# Patient Record
Sex: FEMALE | Race: WHITE | NOT HISPANIC OR LATINO | Employment: UNEMPLOYED | URBAN - METROPOLITAN AREA
[De-identification: names, ages, dates, MRNs, and addresses within clinical notes are randomized per-mention and may not be internally consistent; named-entity substitution may affect disease eponyms.]

---

## 2019-05-02 ENCOUNTER — OFFICE VISIT (OUTPATIENT)
Dept: OBSTETRICS AND GYNECOLOGY | Facility: CLINIC | Age: 64
End: 2019-05-02
Attending: OBSTETRICS & GYNECOLOGY
Payer: COMMERCIAL

## 2019-05-02 VITALS
SYSTOLIC BLOOD PRESSURE: 140 MMHG | BODY MASS INDEX: 27.98 KG/M2 | HEIGHT: 63 IN | DIASTOLIC BLOOD PRESSURE: 88 MMHG | WEIGHT: 157.94 LBS

## 2019-05-02 DIAGNOSIS — N63.10 BREAST MASS, RIGHT: ICD-10-CM

## 2019-05-02 DIAGNOSIS — Z11.51 SCREENING FOR HUMAN PAPILLOMAVIRUS (HPV): ICD-10-CM

## 2019-05-02 DIAGNOSIS — Z01.419 ENCOUNTER FOR GYNECOLOGICAL EXAMINATION: Primary | ICD-10-CM

## 2019-05-02 DIAGNOSIS — N64.4 BREAST PAIN: ICD-10-CM

## 2019-05-02 PROCEDURE — 99387 PR PREVENTIVE VISIT,NEW,65 & OVER: ICD-10-PCS | Mod: S$GLB,,, | Performed by: OBSTETRICS & GYNECOLOGY

## 2019-05-02 PROCEDURE — 99999 PR PBB SHADOW E&M-EST. PATIENT-LVL III: CPT | Mod: PBBFAC,,, | Performed by: OBSTETRICS & GYNECOLOGY

## 2019-05-02 PROCEDURE — 88175 CYTOPATH C/V AUTO FLUID REDO: CPT

## 2019-05-02 PROCEDURE — 99999 PR PBB SHADOW E&M-EST. PATIENT-LVL III: ICD-10-PCS | Mod: PBBFAC,,, | Performed by: OBSTETRICS & GYNECOLOGY

## 2019-05-02 PROCEDURE — 99387 INIT PM E/M NEW PAT 65+ YRS: CPT | Mod: S$GLB,,, | Performed by: OBSTETRICS & GYNECOLOGY

## 2019-05-02 RX ORDER — MINOCYCLINE HYDROCHLORIDE 100 MG/1
CAPSULE ORAL
Refills: 1 | COMMUNITY
Start: 2019-02-25 | End: 2019-05-02

## 2019-05-02 RX ORDER — CETIRIZINE HYDROCHLORIDE 10 MG/1
10 TABLET ORAL NIGHTLY
Refills: 3 | COMMUNITY
Start: 2019-02-14 | End: 2023-12-23

## 2019-05-02 RX ORDER — PROMETHAZINE HYDROCHLORIDE AND DEXTROMETHORPHAN HYDROBROMIDE 6.25; 15 MG/5ML; MG/5ML
SYRUP ORAL
Refills: 0 | COMMUNITY
Start: 2019-03-14 | End: 2019-05-02

## 2019-05-02 RX ORDER — HYDROCODONE BITARTRATE AND ACETAMINOPHEN 10; 325 MG/1; MG/1
1 TABLET ORAL 3 TIMES DAILY PRN
Refills: 0 | COMMUNITY
Start: 2019-04-15 | End: 2023-12-23

## 2019-05-02 RX ORDER — BENZPHETAMINE HYDROCHLORIDE 50 MG/1
TABLET ORAL
Refills: 0 | COMMUNITY
Start: 2019-04-24 | End: 2023-12-23

## 2019-05-02 RX ORDER — AZITHROMYCIN 250 MG/1
TABLET, FILM COATED ORAL
Refills: 0 | COMMUNITY
Start: 2019-01-28 | End: 2019-05-02

## 2019-05-02 RX ORDER — MUPIROCIN 20 MG/G
OINTMENT TOPICAL
Refills: 0 | COMMUNITY
Start: 2019-03-14 | End: 2019-05-02

## 2019-05-02 NOTE — PROGRESS NOTES
CC: Well woman exam    Candi Chauhan is a 63 y.o. female  presents for a well woman exam.  She is not established. LMP: No LMP recorded. Patient is postmenopausal..      C/o throbbing pain under right arm, h/o augmentation complicated by encapsulation for the past 5 years, hasn't had a mmg since 2016 and feels extra hard spot at 2 oclock    On HRT compounded  100mg progesterone, 4mg testosterone, wants refill      Past Medical History:   Diagnosis Date    Anxiety     Back pain     Depression     Foot pain     Knee pain      Past Surgical History:   Procedure Laterality Date    ANKLE SURGERY  2014     SECTION      EXCISION-WIDE LOCAL OF THE VULVAR LESION N/A 10/11/2016    Performed by [facility]aves [facility] at [facility] OR    KNEE SURGERY       Social History     Socioeconomic History    Marital status:      Spouse name: Not on file    Number of children: Not on file    Years of education: Not on file    Highest education level: Not on file   Occupational History    Not on file   Social Needs    Financial resource strain: Not on file    Food insecurity:     Worry: Not on file     Inability: Not on file    Transportation needs:     Medical: Not on file     Non-medical: Not on file   Tobacco Use    Smoking status: Never Smoker    Smokeless tobacco: Never Used   Substance and Sexual Activity    Alcohol use: Yes     Comment: occ    Drug use: No    Sexual activity: Yes     Partners: Male     Birth control/protection: None   Lifestyle    Physical activity:     Days per week: Not on file     Minutes per session: Not on file    Stress: Not on file   Relationships    Social connections:     Talks on phone: Not on file     Gets together: Not on file     Attends Yarsani service: Not on file     Active member of club or organization: Not on file     Attends meetings of clubs or organizations: Not on file     Relationship status: Not on file   Other Topics Concern    Not on file  "  Social History Narrative    Not on file     Family History   Problem Relation Age of Onset    Stroke Mother     Heart failure Father     Diabetes Father     Hypertension Brother     Coronary artery disease Sister     Colon cancer Paternal Cousin     Obesity Sister     Breast cancer Neg Hx     Ovarian cancer Neg Hx      OB History        4    Para        Term                AB        Living   4       SAB        TAB        Ectopic        Multiple        Live Births                     BP (!) 140/88   Ht 5' 3" (1.6 m)   Wt 71.6 kg (157 lb 15.4 oz)   BMI 27.98 kg/m²       ROS:    ROS:  GENERAL: Denies weight gain or weight loss. Feeling well overall.   SKIN: Denies rash or lesions.   HEAD: Denies head injury or headache.   NODES: Denies enlarged lymph nodes.   CHEST: Denies chest pain or shortness of breath.   CARDIOVASCULAR: Denies palpitations or left sided chest pain.   ABDOMEN: No abdominal pain, constipation, diarrhea, nausea, vomiting or rectal bleeding.   URINARY: No frequency, dysuria, hematuria, or burning on urination.  REPRODUCTIVE: See HPI.   BREASTS: The patient performs breast self-examination and denies pain, lumps, or nipple discharge.   HEMATOLOGIC: No easy bruisability or excessive bleeding.   MUSCULOSKELETAL: Denies joint pain or swelling.   NEUROLOGIC: Denies syncope or weakness.   PSYCHIATRIC: Denies depression, anxiety or mood swings.    PHYSICAL EXAM:    APPEARANCE: Well nourished, well developed, in no acute distress.  AFFECT: WNL, alert and oriented x 3  SKIN: No acne or hirsutism  NECK: Neck symmetric without masses or thyromegaly  NODES: No inguinal, cervical, axillary, or femoral lymph node enlargement  CHEST: Good respiratory effect  ABDOMEN: Soft.  No tenderness or masses.  No hepatosplenomegaly.  No hernias.  BREASTS: Symmetrical, no skin changes or visible lesions.  RIGHT BREAST HARD AND CONSISTENT WITH ENCAPSULATION WITH ENHANCED AREA AT 2OCLOCK, nipple " discharge bilaterally. No masses in left breast  PELVIC: Normal external genitalia without lesions.  Normal hair distribution.  Adequate perineal body, normal urethral meatus.  Vagina moist and well rugated without lesions or discharge.  Cervix pink, without lesions, discharge or tenderness.  No significant cystocele or rectocele.  Bimanual exam shows uterus to be normal size, regular, mobile and nontender.  Adnexa without masses or tenderness.    RECTAL: Rectovaginal exam confirms above with normal sphincter tone, no masses.  EXTREMITIES: No edema.      ICD-10-CM ICD-9-CM    1. Encounter for gynecological examination Z01.419 V72.31 Liquid-based pap smear, screening   2. Screening for human papillomavirus (HPV) Z11.51 V73.81 Liquid-based pap smear, screening   3. Breast pain N64.4 611.71 Mammo Digital Diagnostic Bilat w/ Rich      Ambulatory Referral to Breast Surgery      US Breast Bilateral Complete   4. Breast mass, right N63.10 611.72 Ambulatory Referral to Breast Surgery      US Breast Bilateral Complete         Patient was counseled today on A.C.S. Pap guidelines and recommendations for yearly pelvic exams, mammograms and monthly self breast exams; to see her PCP for other health maintenance.     Get mmg if can but otherwise ultrasound.  To breast surgeon    Follow up in about 1 year (around 5/2/2020).

## 2019-05-08 ENCOUNTER — HOSPITAL ENCOUNTER (OUTPATIENT)
Dept: RADIOLOGY | Facility: HOSPITAL | Age: 64
Discharge: HOME | End: 2019-05-08
Attending: OBSTETRICS & GYNECOLOGY
Payer: COMMERCIAL

## 2019-05-08 DIAGNOSIS — N64.4 BREAST PAIN: ICD-10-CM

## 2019-05-08 DIAGNOSIS — N63.10 BREAST MASS, RIGHT: ICD-10-CM

## 2019-05-08 PROCEDURE — 76642 ULTRASOUND BREAST LIMITED: CPT | Mod: TC,RT

## 2019-05-08 PROCEDURE — 77066 MAMMO DIGITAL DIAGNOSTIC BILAT WITH TOMOSYNTHESIS_CAD: ICD-10-PCS | Mod: 26,,, | Performed by: RADIOLOGY

## 2019-05-08 PROCEDURE — 77062 MAMMO DIGITAL DIAGNOSTIC BILAT WITH TOMOSYNTHESIS_CAD: ICD-10-PCS | Mod: 26,,, | Performed by: RADIOLOGY

## 2019-05-08 PROCEDURE — 77066 DX MAMMO INCL CAD BI: CPT | Mod: TC

## 2019-05-08 PROCEDURE — 76642 ULTRASOUND BREAST LIMITED: CPT | Mod: 26,RT,, | Performed by: RADIOLOGY

## 2019-05-08 PROCEDURE — 77066 DX MAMMO INCL CAD BI: CPT | Mod: 26,,, | Performed by: RADIOLOGY

## 2019-05-08 PROCEDURE — 77062 BREAST TOMOSYNTHESIS BI: CPT | Mod: 26,,, | Performed by: RADIOLOGY

## 2019-05-08 PROCEDURE — 76642 US BREAST RIGHT LIMITED: ICD-10-PCS | Mod: 26,RT,, | Performed by: RADIOLOGY

## 2019-05-10 ENCOUNTER — TELEPHONE (OUTPATIENT)
Dept: OBSTETRICS AND GYNECOLOGY | Facility: CLINIC | Age: 64
End: 2019-05-10

## 2019-05-10 NOTE — TELEPHONE ENCOUNTER
----- Message from Dax De Souza MD sent at 5/9/2019 11:30 AM CDT -----  Call patient mmg normal, I still want her to see breast surgeon.  
Pt. notifed with normal mammogram and will see a breast surgeon as well.     Message related to pt. As follow:      
General

## 2019-05-22 ENCOUNTER — TELEPHONE (OUTPATIENT)
Dept: SURGERY | Facility: CLINIC | Age: 64
End: 2019-05-22

## 2019-05-22 NOTE — TELEPHONE ENCOUNTER
Returned patient's call and have her rescheduled to see Mariangel STEIN on 5/29/19 at 3pm. Patient voiced understanding of appointment date, time, and location. Appointment letter mailed to patient.    ----- Message from Sepideh Nava sent at 5/22/2019  8:59 AM CDT -----  Reason for call:Pt calling to reschedule her appt, next available on my end is 7/11/19.        Communication Preference:[phone]    Additional Information:

## 2019-05-29 ENCOUNTER — OFFICE VISIT (OUTPATIENT)
Dept: PLASTIC SURGERY | Facility: CLINIC | Age: 64
End: 2019-05-29
Payer: COMMERCIAL

## 2019-05-29 VITALS
HEIGHT: 63 IN | SYSTOLIC BLOOD PRESSURE: 177 MMHG | TEMPERATURE: 98 F | BODY MASS INDEX: 28.56 KG/M2 | HEART RATE: 73 BPM | DIASTOLIC BLOOD PRESSURE: 73 MMHG | WEIGHT: 161.19 LBS

## 2019-05-29 DIAGNOSIS — T85.44XA CAPSULAR CONTRACTURE OF BREAST IMPLANT, INITIAL ENCOUNTER: Primary | ICD-10-CM

## 2019-05-29 PROCEDURE — 3008F PR BODY MASS INDEX (BMI) DOCUMENTED: ICD-10-PCS | Mod: CPTII,S$GLB,, | Performed by: SURGERY

## 2019-05-29 PROCEDURE — 1101F PT FALLS ASSESS-DOCD LE1/YR: CPT | Mod: CPTII,S$GLB,, | Performed by: SURGERY

## 2019-05-29 PROCEDURE — 99999 PR PBB SHADOW E&M-EST. PATIENT-LVL III: CPT | Mod: PBBFAC,,, | Performed by: SURGERY

## 2019-05-29 PROCEDURE — 99203 OFFICE O/P NEW LOW 30 MIN: CPT | Mod: S$GLB,,, | Performed by: SURGERY

## 2019-05-29 PROCEDURE — 99999 PR PBB SHADOW E&M-EST. PATIENT-LVL III: ICD-10-PCS | Mod: PBBFAC,,, | Performed by: SURGERY

## 2019-05-29 PROCEDURE — 99203 PR OFFICE/OUTPT VISIT, NEW, LEVL III, 30-44 MIN: ICD-10-PCS | Mod: S$GLB,,, | Performed by: SURGERY

## 2019-05-29 PROCEDURE — 3008F BODY MASS INDEX DOCD: CPT | Mod: CPTII,S$GLB,, | Performed by: SURGERY

## 2019-05-29 PROCEDURE — 1101F PR PT FALLS ASSESS DOC 0-1 FALLS W/OUT INJ PAST YR: ICD-10-PCS | Mod: CPTII,S$GLB,, | Performed by: SURGERY

## 2019-05-29 NOTE — PROGRESS NOTES
Candi Chauhan is a 63 y.o. female who's overall healthy with history of bilateral breat implants placed in  presents for evaluation of right breast pain that began about 6 months ago. She denies trauma to the breast, but reports that her right breast has progressively become more painful and firm over the last six months. She reports that her right implant is placed above the pectoral muscle and the left implant is under the pectoral muscle because she developed a hematoma post operatively in the left breast. She had recent mammogram and US that showed no malignancy. She wishes to have the implants removed and replaced and a lift done. She's overall healthy, drinks minimally and does not smoke. She's size C cup size.     Past Medical History:   Diagnosis Date    Anxiety     Back pain     Depression     Foot pain     Knee pain        Past Surgical History:   Procedure Laterality Date    ANKLE SURGERY      AUGMENTATION OF BREAST       SECTION      EXCISION-WIDE LOCAL OF THE VULVAR LESION N/A 10/11/2016    Performed by [facility]aves [facility] at [facility] OR    KNEE SURGERY         Current Outpatient Medications on File Prior to Visit   Medication Sig Dispense Refill    alprazolam (XANAX) 1 MG tablet TAKE ONE TABLET BY MOUTH THREE TIMES DAILY MAY CAUSE DROWSINESS  0    benzphetamine 50 mg Tab TAKE ONE TABLET BY MOUTH upto THREE TIMES DAILY  0    cetirizine (ZYRTEC) 10 MG tablet Take 10 mg by mouth nightly.  3    fluticasone propionate (FLONASE) 50 mcg/actuation nasal spray SPRAY TWICE INTO EACH NOSTRIL ONCE A DAY AS NEEDED  0    HYDROcodone-acetaminophen (NORCO)  mg per tablet Take 1 tablet by mouth 3 (three) times daily as needed.  0    ibuprofen (ADVIL,MOTRIN) 800 MG tablet Take 1 tablet (800 mg total) by mouth every 8 (eight) hours as needed for Pain. 30 tablet 1    PROAIR HFA 90 mcg/actuation inhaler Take 2 puff(s) by mouth every 6 hours as needed  0     No current  facility-administered medications on file prior to visit.        Observation  Constitutional: negative for fevers  Eyes: negative visual changes  ENT: negative for hearing loss  Respiratory: negative for dyspnea  Cardiovascular: negative for chest pain  Gastrointestinal: negative for abdominal pain    Vitals: Afebrile.  Vital signs stable.  General: No acute distress.  Cardio: Regular rate.  Chest: No increased work of breathing.    Breast:  Right breast is firm and quite tender to touch. No masses palpated.   Left breast has 4 mm mole.    A/P  Patient likely has ruptured implant.   Patient was offered removal, replacement, or just a mastopexy, patient elects to proceed with replacement and mastopexy.   Will obtain photos  Risks and benefits discussed including loss of nipple. Her procedure will be done in two stages to provide best possible blood supply to the nipple.   She currently wears a size C.

## 2019-05-29 NOTE — LETTER
[facility]  [address]  [facility] [address]  Phone: [phone]  Fax: [phone] May 29, 2019      Agustina Mejia MD  [address]  [facility] [place] [address]    Patient: Candi Chauhan   MR Number: [id number]   YOB: 1955   Date of Visit: 5/29/2019     Dear Dr. Agustina Mejia:    Thank you for referring Candi Chauhan to me for evaluation. Below you will find relevant portions of my assessment and plan of care.    Ms. Chauhan' surgery will be relatively complex.  She has a severe capsular contracture of the right breast, which is in the prepectoral position and mild capsular contracture of the left breast, which is subpectoral.  She has a fair amount of breast tissue on the left side, but not much breast tissue on the right.  Therefore, she is going to need an implant exchange plus a lift, but I feel that this should be done in a two staged procedure.  This can be very difficult to peel the calcified right prepectoral implant from behind the nipple areolar complex.  So, I think that we will go ahead and do an implant removal, total capsulectomy, implant replacement and then come back in a second procedure and do a bilateral mastopexy.  We will have Laura quote fees.     If you have questions, please do not hesitate to call me. I look forward to following Candi Chauhan along with you.    Sincerely,    Joaquin Kidd MD  Section of Plastic Surgery  Department of Surgery  [facility]     CRB/hcr    CC:  Jeremias Nguyễn MD

## 2019-05-29 NOTE — PROGRESS NOTES
I personally have examined the patient and agree with the resident's findings and plan of action.  Ms. Chauhan' surgery will be relatively complex.  She has a severe capsular   contracture of the right breast, which is in the prepectoral position and mild   capsular contracture of the left breast, which is subpectoral.  She has a fair   amount of breast tissue on the left side, but not much breast tissue on the   right.  Therefore, she is going to need an implant exchange plus a lift, but I   feel that this should be done in a two staged procedure.  This can be very   difficult to peel the calcified right prepectoral implant from behind the nipple   areolar complex.  So, I think that we will go ahead and do an implant removal,   total capsulectomy, implant replacement and then come back in a second procedure   and do a bilateral mastopexy.  We will have Laura bessote fees on Monday.        MANUELA/VERNA  dd: 05/29/2019 16:15:27 (CDT)  td: 05/30/2019 01:56:47 (CDT)  Doc ID   #[id number]  Job ID #[id number]    CC:

## 2019-06-04 ENCOUNTER — TELEPHONE (OUTPATIENT)
Dept: PLASTIC SURGERY | Facility: CLINIC | Age: 64
End: 2019-06-04

## 2019-06-04 NOTE — TELEPHONE ENCOUNTER
Called patient to book surgery; pt states she needs quotes for surgery procedure and still needs to know the details of surgery.

## 2019-11-19 ENCOUNTER — TELEPHONE (OUTPATIENT)
Dept: OBSTETRICS AND GYNECOLOGY | Facility: CLINIC | Age: 64
End: 2019-11-19

## 2019-11-19 NOTE — TELEPHONE ENCOUNTER
[facility] [address] [place] [address] faxed over a request for pt of BI-E 80:20 2mg/ Progest 100mg/Testosterone 4mg capsule [facility] # [phone] fax # [phone]

## 2019-11-19 NOTE — TELEPHONE ENCOUNTER
[facility] called asking for Dr. De Souza to confirm a compound medication for pt. Please advise. Thank you.

## 2020-04-06 ENCOUNTER — TELEPHONE (OUTPATIENT)
Dept: OBSTETRICS AND GYNECOLOGY | Facility: CLINIC | Age: 65
End: 2020-04-06

## 2020-04-06 NOTE — TELEPHONE ENCOUNTER
Pt. Requesting refill:        BI-E 80:20 2mg/ Progest 100mg/Testosterone 4mg capsule [facility] # [phone] fax # [phone]

## 2020-04-07 NOTE — TELEPHONE ENCOUNTER
Pharmacy number provided:  Will you tell me exactly how to order it for her, please.     Progesterone/Testosterone

## 2020-04-07 NOTE — TELEPHONE ENCOUNTER
Pt. Requesting refill:   we have to calling her medication:      BI-E 80:20 2mg/ Progest 100mg/Testosterone 4mg capsule,  [facility]  # [phone] fax # [phone]

## 2020-04-07 NOTE — TELEPHONE ENCOUNTER
Call the pharmacist and give verbal to authorize a refill.  It's a compounding phamarcy and it's their dosing and instructions.

## 2020-04-09 ENCOUNTER — TELEPHONE (OUTPATIENT)
Dept: OBSTETRICS AND GYNECOLOGY | Facility: CLINIC | Age: 65
End: 2020-04-09

## 2020-04-13 ENCOUNTER — TELEPHONE (OUTPATIENT)
Dept: OBSTETRICS AND GYNECOLOGY | Facility: CLINIC | Age: 65
End: 2020-04-13

## 2020-04-13 NOTE — TELEPHONE ENCOUNTER
RX:  Called in:  BI-E 80:20 2mg/ Progest 100mg/Testosterone 4mg capsule,  [facility]  # [phone] fax # [phone]

## 2020-06-08 ENCOUNTER — TELEPHONE (OUTPATIENT)
Dept: OBSTETRICS AND GYNECOLOGY | Facility: CLINIC | Age: 65
End: 2020-06-08

## 2020-06-08 NOTE — TELEPHONE ENCOUNTER
Pt. Requesting refill of a compound med.  Progesterone/testosterone  Will you approve this medication,  Last refill 11/2019

## 2020-06-09 NOTE — TELEPHONE ENCOUNTER
Spoke with pt. Who stated she has enough meds not and will have pharmacy request a refill of her compound meds. Till later, and wants to call back to set up appt. For her annual

## 2020-07-21 ENCOUNTER — TELEPHONE (OUTPATIENT)
Dept: OBSTETRICS AND GYNECOLOGY | Facility: CLINIC | Age: 65
End: 2020-07-21

## 2020-07-21 NOTE — TELEPHONE ENCOUNTER
Pt. Will contact her pharmacy to discuss refill request, pt. Has had enough refills to do her till sept. She will contact me back.

## 2020-07-23 ENCOUNTER — TELEPHONE (OUTPATIENT)
Dept: OBSTETRICS AND GYNECOLOGY | Facility: CLINIC | Age: 65
End: 2020-07-23

## 2020-07-23 NOTE — TELEPHONE ENCOUNTER
Called in compound medication for pt. With dr. Borges's approval, dr. De Souza is out today.    Bi-E 8020 2 mg / [id number]/Testosterone4 mg Capsule with one additional refill, pt. Has annual appt. Set with dr. wax

## 2020-07-23 NOTE — TELEPHONE ENCOUNTER
Bi-E 8020 2mg/[id number]/[id number] Capsule     Add as: unknown     Will you approve this medication to be called in for pt.     Dr. De Souza out today.

## 2020-10-01 ENCOUNTER — OFFICE VISIT (OUTPATIENT)
Dept: OBSTETRICS AND GYNECOLOGY | Facility: CLINIC | Age: 65
End: 2020-10-01
Attending: OBSTETRICS & GYNECOLOGY
Payer: MEDICARE

## 2020-10-01 ENCOUNTER — LAB VISIT (OUTPATIENT)
Dept: LAB | Facility: OTHER | Age: 65
End: 2020-10-01
Attending: OBSTETRICS & GYNECOLOGY
Payer: MEDICARE

## 2020-10-01 VITALS
WEIGHT: 178.44 LBS | BODY MASS INDEX: 31.62 KG/M2 | HEIGHT: 63 IN | DIASTOLIC BLOOD PRESSURE: 70 MMHG | SYSTOLIC BLOOD PRESSURE: 130 MMHG

## 2020-10-01 DIAGNOSIS — Z11.3 SCREEN FOR STD (SEXUALLY TRANSMITTED DISEASE): ICD-10-CM

## 2020-10-01 DIAGNOSIS — R10.2 PELVIC PAIN: ICD-10-CM

## 2020-10-01 DIAGNOSIS — Z11.51 ENCOUNTER FOR SCREENING FOR HUMAN PAPILLOMAVIRUS (HPV): ICD-10-CM

## 2020-10-01 DIAGNOSIS — Z01.419 ENCOUNTER FOR GYNECOLOGICAL EXAMINATION: Primary | ICD-10-CM

## 2020-10-01 DIAGNOSIS — R10.819 ABDOMINAL TENDERNESS, REBOUND TENDERNESS PRESENCE NOT SPECIFIED, UNSPECIFIED LOCATION: ICD-10-CM

## 2020-10-01 DIAGNOSIS — Z11.3 SCREENING EXAMINATION FOR STD (SEXUALLY TRANSMITTED DISEASE): ICD-10-CM

## 2020-10-01 DIAGNOSIS — Z12.4 PAP SMEAR FOR CERVICAL CANCER SCREENING: ICD-10-CM

## 2020-10-01 DIAGNOSIS — Z12.31 OTHER SCREENING MAMMOGRAM: ICD-10-CM

## 2020-10-01 LAB
CANDIDA RRNA VAG QL PROBE: NEGATIVE
G VAGINALIS RRNA GENITAL QL PROBE: NEGATIVE
T VAGINALIS RRNA GENITAL QL PROBE: NEGATIVE

## 2020-10-01 PROCEDURE — G0101 CA SCREEN;PELVIC/BREAST EXAM: HCPCS | Mod: S$GLB,,, | Performed by: OBSTETRICS & GYNECOLOGY

## 2020-10-01 PROCEDURE — 87510 GARDNER VAG DNA DIR PROBE: CPT

## 2020-10-01 PROCEDURE — 86703 HIV-1/HIV-2 1 RESULT ANTBDY: CPT

## 2020-10-01 PROCEDURE — 99999 PR PBB SHADOW E&M-EST. PATIENT-LVL IV: CPT | Mod: PBBFAC,,, | Performed by: OBSTETRICS & GYNECOLOGY

## 2020-10-01 PROCEDURE — 36415 COLL VENOUS BLD VENIPUNCTURE: CPT

## 2020-10-01 PROCEDURE — 88175 CYTOPATH C/V AUTO FLUID REDO: CPT

## 2020-10-01 PROCEDURE — 80074 ACUTE HEPATITIS PANEL: CPT

## 2020-10-01 PROCEDURE — 87480 CANDIDA DNA DIR PROBE: CPT

## 2020-10-01 PROCEDURE — 86592 SYPHILIS TEST NON-TREP QUAL: CPT

## 2020-10-01 PROCEDURE — 87624 HPV HI-RISK TYP POOLED RSLT: CPT

## 2020-10-01 PROCEDURE — G0101 PR CA SCREEN;PELVIC/BREAST EXAM: ICD-10-PCS | Mod: S$GLB,,, | Performed by: OBSTETRICS & GYNECOLOGY

## 2020-10-01 PROCEDURE — 99999 PR PBB SHADOW E&M-EST. PATIENT-LVL IV: ICD-10-PCS | Mod: PBBFAC,,, | Performed by: OBSTETRICS & GYNECOLOGY

## 2020-10-02 LAB
C TRACH RRNA SPEC QL NAA+PROBE: NEGATIVE
HAV IGM SERPL QL IA: NEGATIVE
HBV CORE IGM SERPL QL IA: NEGATIVE
HBV SURFACE AG SERPL QL IA: NEGATIVE
HCV AB SERPL QL IA: NEGATIVE
HIV 1+2 AB+HIV1 P24 AG SERPL QL IA: NEGATIVE
N GONORRHOEA RRNA SPEC QL NAA+PROBE: NEGATIVE
RPR SER QL: NORMAL

## 2020-10-06 ENCOUNTER — TELEPHONE (OUTPATIENT)
Dept: OBSTETRICS AND GYNECOLOGY | Facility: CLINIC | Age: 65
End: 2020-10-06

## 2020-10-06 NOTE — TELEPHONE ENCOUNTER
----- Message from Dax D eSouza MD sent at 10/6/2020 12:23 PM CDT -----  Call patient std labs and std cultures are all negative.

## 2020-10-07 ENCOUNTER — TELEPHONE (OUTPATIENT)
Dept: OBSTETRICS AND GYNECOLOGY | Facility: CLINIC | Age: 65
End: 2020-10-07

## 2020-10-07 NOTE — TELEPHONE ENCOUNTER
Pt annual was 10/1/20.  Prescription needs to be called in for refill.  Please advise if you approve.  Patient is aware that your out of office until tomorrow.        Negin REYES Staff 30 minutes ago (11:23 AM)     [facility] calling pt needs refill of BI-E 80:20 2mg/ Progest 100mg/Testosterone 4mg capsule [facility] # [phone] fax # [phone]    Routing comment       [facility] [place], [address] [phone]  Negin Coreas 31 minutes ago (11:22 AM)       Negin REYES Staff 43 minutes ago (12:26 PM)     Pt is calling to get medication refilled would like it before the blade comes. [facility]    Routing comment       Candi Chauhan [phone]  Negin Coreas 44 minutes ago (12:25 PM)

## 2020-10-08 NOTE — TELEPHONE ENCOUNTER
Please call in rx BI-E 80:20 2mg/ Progest 100mg/Testosterone 4mg since I cannot put it in epic.  Give 90d  Supply with 3 refills

## 2020-10-12 LAB
HPV HR 12 DNA SPEC QL NAA+PROBE: NEGATIVE
HPV16 AG SPEC QL: NEGATIVE
HPV18 DNA SPEC QL NAA+PROBE: NEGATIVE

## 2020-10-23 LAB
FINAL PATHOLOGIC DIAGNOSIS: NORMAL
Lab: NORMAL

## 2021-01-14 ENCOUNTER — TELEPHONE (OUTPATIENT)
Dept: OBSTETRICS AND GYNECOLOGY | Facility: CLINIC | Age: 66
End: 2021-01-14

## 2021-04-15 ENCOUNTER — PATIENT MESSAGE (OUTPATIENT)
Dept: RESEARCH | Facility: HOSPITAL | Age: 66
End: 2021-04-15

## 2021-05-15 NOTE — PROGRESS NOTES
"CC: Well woman exam     Candi Chauhan is a 65 y.o. female  presents for a well woman exam.      Both implants are ruptured and has pain from encapuslation on the right.  Last mmg and u/s was 2 months ago with [facility] and is getting a MRI.     Having lower abdominal pain but starts from her back, has back issues due to her knee.  Wants u/s to be safe.  No PMB.  Not sexually active.     After 47 years of marriage  having affair, he is still living in same house as her due to finances and two of her sons.  Two daughters not taking it well.  Feels safe.  He now does all house work and she is living in separate bedroom.  He is on couch.  They just started talking again a month ago.  She wants to leave once finances are better.  Wants std screening.     Past Medical History:   Diagnosis Date    Anxiety     Back pain     Depression     Foot pain     Knee pain        Past Surgical History:   Procedure Laterality Date    ANKLE SURGERY  2014    AUGMENTATION OF BREAST       SECTION      KNEE SURGERY         OB History    Para Term  AB Living   4 4 4     4   SAB TAB Ectopic Multiple Live Births                  # Outcome Date GA Lbr Gibson/2nd Weight Sex Delivery Anes PTL Lv   4 Term            3 Term            2 Term            1 Term                Family History   Problem Relation Age of Onset    Stroke Mother     Heart failure Father     Diabetes Father     Hypertension Brother     Coronary artery disease Sister     Colon cancer Paternal Cousin     Obesity Sister     Breast cancer Neg Hx     Ovarian cancer Neg Hx        Social History     Tobacco Use    Smoking status: Never Smoker    Smokeless tobacco: Never Used   Substance Use Topics    Alcohol use: Yes     Comment: occ    Drug use: No       /70   Ht 5' 3" (1.6 m)   Wt 81 kg (178 lb 7.4 oz)   BMI 31.61 kg/m²     ROS:  GENERAL: Denies weight gain or weight loss. Feeling well overall.   SKIN: Denies rash " or lesions.   HEAD: Denies head injury or headache.   NODES: Denies enlarged lymph nodes.   CHEST: Denies chest pain or shortness of breath.   CARDIOVASCULAR: Denies palpitations or left sided chest pain.   ABDOMEN: No abdominal pain, constipation, diarrhea, nausea, vomiting or rectal bleeding.   URINARY: No frequency, dysuria, hematuria, or burning on urination.  REPRODUCTIVE: See HPI.   BREASTS: The patient performs breast self-examination and denies pain, lumps, or nipple discharge.   HEMATOLOGIC: No easy bruisability or excessive bleeding.  MUSCULOSKELETAL: Denies joint pain or swelling.   NEUROLOGIC: Denies syncope or weakness.   PSYCHIATRIC: Denies depression, anxiety or mood swings.    Physical Exam:    APPEARANCE: Well nourished, well developed, in no acute distress.  AFFECT: WNL, alert and oriented x 3  SKIN: No acne or hirsutism  NECK: Neck symmetric without masses or thyromegaly  NODES: No inguinal, cervical, axillary, or femoral lymph node enlargement  CHEST: Good respiratory effect  ABDOMEN: Soft.  No tenderness or masses.  No hepatosplenomegaly.  No hernias.  BREASTS: Symmetrical, no skin changes or visible lesions.  RIGHT BREAST ENCAPSULATION WITH MASS AT 2 OCLOCK DIFFICULT TO DETERMINE,  LEFT BREAST LESS ENCAPSULATION, NO MASSES.  no nipple discharge bilaterally.  PELVIC: Normal external genitalia without lesions.  Normal hair distribution.  Adequate perineal body, normal urethral meatus.  Vagina moist and well rugated without lesions or discharge.  Cervix pink, without lesions, discharge or tenderness.  No significant cystocele or rectocele.  Bimanual exam shows uterus to be normal size, regular, mobile and nontender.  Adnexa without masses or tenderness.    EXTREMITIES: No edema.    ASSESSMENT AND PLAN  1. Encounter for gynecological examination     2. Other screening mammogram     3. Screen for STD (sexually transmitted disease)  HIV 1/2 Ag/Ab (4th Gen)    RPR    Hepatitis Panel, Acute   4. Pelvic  pain  US Pelvis Comp with Transvag NON-OB (xpd    US Pelvis Comp with Transvag NON-OB (xpd       Patient was counseled today on A.C.S. Pap guidelines and recommendations for yearly pelvic exams, mammograms and monthly self breast exams; to see her PCP for other health maintenance.     Recommend see breast surgeon for implant removal and reconstruction info given.  Recommend breast MRI, pcp gave the order for [facility] she needs to schedule.  Get u/s at [facility] due to cost to r/o gyn pathology but suspect musculoskeletal.     Follow up in about 1 year (around 10/1/2021).         No

## 2021-07-13 ENCOUNTER — TELEPHONE (OUTPATIENT)
Dept: OBSTETRICS AND GYNECOLOGY | Facility: CLINIC | Age: 66
End: 2021-07-13
Payer: MEDICARE

## 2021-08-20 ENCOUNTER — PATIENT MESSAGE (OUTPATIENT)
Dept: OBSTETRICS AND GYNECOLOGY | Facility: CLINIC | Age: 66
End: 2021-08-20

## 2022-03-16 ENCOUNTER — TELEPHONE (OUTPATIENT)
Dept: OBSTETRICS AND GYNECOLOGY | Facility: CLINIC | Age: 67
End: 2022-03-16
Payer: MEDICARE

## 2022-04-04 ENCOUNTER — OFFICE VISIT (OUTPATIENT)
Dept: OBSTETRICS AND GYNECOLOGY | Facility: CLINIC | Age: 67
End: 2022-04-04
Payer: MEDICARE

## 2022-04-04 VITALS
WEIGHT: 182.13 LBS | DIASTOLIC BLOOD PRESSURE: 75 MMHG | HEIGHT: 63 IN | SYSTOLIC BLOOD PRESSURE: 115 MMHG | BODY MASS INDEX: 32.27 KG/M2

## 2022-04-04 DIAGNOSIS — R10.32 LLQ PAIN: ICD-10-CM

## 2022-04-04 DIAGNOSIS — N95.1 MENOPAUSAL STATE: ICD-10-CM

## 2022-04-04 DIAGNOSIS — Z79.890 HORMONE REPLACEMENT THERAPY: ICD-10-CM

## 2022-04-04 DIAGNOSIS — M81.0 AGE-RELATED OSTEOPOROSIS WITHOUT CURRENT PATHOLOGICAL FRACTURE: ICD-10-CM

## 2022-04-04 DIAGNOSIS — Z01.419 ENCOUNTER FOR ANNUAL ROUTINE GYNECOLOGICAL EXAMINATION: Primary | ICD-10-CM

## 2022-04-04 DIAGNOSIS — Z12.31 BREAST CANCER SCREENING BY MAMMOGRAM: ICD-10-CM

## 2022-04-04 PROCEDURE — 99999 PR PBB SHADOW E&M-EST. PATIENT-LVL IV: ICD-10-PCS | Mod: PBBFAC,,, | Performed by: OBSTETRICS & GYNECOLOGY

## 2022-04-04 PROCEDURE — G0101 CA SCREEN;PELVIC/BREAST EXAM: HCPCS | Mod: S$GLB,,, | Performed by: OBSTETRICS & GYNECOLOGY

## 2022-04-04 PROCEDURE — 1101F PT FALLS ASSESS-DOCD LE1/YR: CPT | Mod: CPTII,S$GLB,, | Performed by: OBSTETRICS & GYNECOLOGY

## 2022-04-04 PROCEDURE — 3074F PR MOST RECENT SYSTOLIC BLOOD PRESSURE < 130 MM HG: ICD-10-PCS | Mod: CPTII,S$GLB,, | Performed by: OBSTETRICS & GYNECOLOGY

## 2022-04-04 PROCEDURE — 1126F AMNT PAIN NOTED NONE PRSNT: CPT | Mod: CPTII,S$GLB,, | Performed by: OBSTETRICS & GYNECOLOGY

## 2022-04-04 PROCEDURE — 1160F PR REVIEW ALL MEDS BY PRESCRIBER/CLIN PHARMACIST DOCUMENTED: ICD-10-PCS | Mod: CPTII,S$GLB,, | Performed by: OBSTETRICS & GYNECOLOGY

## 2022-04-04 PROCEDURE — G0101 PR CA SCREEN;PELVIC/BREAST EXAM: ICD-10-PCS | Mod: S$GLB,,, | Performed by: OBSTETRICS & GYNECOLOGY

## 2022-04-04 PROCEDURE — 1160F RVW MEDS BY RX/DR IN RCRD: CPT | Mod: CPTII,S$GLB,, | Performed by: OBSTETRICS & GYNECOLOGY

## 2022-04-04 PROCEDURE — 1126F PR PAIN SEVERITY QUANTIFIED, NO PAIN PRESENT: ICD-10-PCS | Mod: CPTII,S$GLB,, | Performed by: OBSTETRICS & GYNECOLOGY

## 2022-04-04 PROCEDURE — 3288F PR FALLS RISK ASSESSMENT DOCUMENTED: ICD-10-PCS | Mod: CPTII,S$GLB,, | Performed by: OBSTETRICS & GYNECOLOGY

## 2022-04-04 PROCEDURE — 3074F SYST BP LT 130 MM HG: CPT | Mod: CPTII,S$GLB,, | Performed by: OBSTETRICS & GYNECOLOGY

## 2022-04-04 PROCEDURE — 3008F BODY MASS INDEX DOCD: CPT | Mod: CPTII,S$GLB,, | Performed by: OBSTETRICS & GYNECOLOGY

## 2022-04-04 PROCEDURE — 1101F PR PT FALLS ASSESS DOC 0-1 FALLS W/OUT INJ PAST YR: ICD-10-PCS | Mod: CPTII,S$GLB,, | Performed by: OBSTETRICS & GYNECOLOGY

## 2022-04-04 PROCEDURE — 3008F PR BODY MASS INDEX (BMI) DOCUMENTED: ICD-10-PCS | Mod: CPTII,S$GLB,, | Performed by: OBSTETRICS & GYNECOLOGY

## 2022-04-04 PROCEDURE — 1159F PR MEDICATION LIST DOCUMENTED IN MEDICAL RECORD: ICD-10-PCS | Mod: CPTII,S$GLB,, | Performed by: OBSTETRICS & GYNECOLOGY

## 2022-04-04 PROCEDURE — 3078F PR MOST RECENT DIASTOLIC BLOOD PRESSURE < 80 MM HG: ICD-10-PCS | Mod: CPTII,S$GLB,, | Performed by: OBSTETRICS & GYNECOLOGY

## 2022-04-04 PROCEDURE — 99999 PR PBB SHADOW E&M-EST. PATIENT-LVL IV: CPT | Mod: PBBFAC,,, | Performed by: OBSTETRICS & GYNECOLOGY

## 2022-04-04 PROCEDURE — 3078F DIAST BP <80 MM HG: CPT | Mod: CPTII,S$GLB,, | Performed by: OBSTETRICS & GYNECOLOGY

## 2022-04-04 PROCEDURE — 3288F FALL RISK ASSESSMENT DOCD: CPT | Mod: CPTII,S$GLB,, | Performed by: OBSTETRICS & GYNECOLOGY

## 2022-04-04 PROCEDURE — 1159F MED LIST DOCD IN RCRD: CPT | Mod: CPTII,S$GLB,, | Performed by: OBSTETRICS & GYNECOLOGY

## 2022-04-04 RX ORDER — TRAZODONE HYDROCHLORIDE 50 MG/1
50 TABLET ORAL NIGHTLY
COMMUNITY

## 2022-04-04 RX ORDER — VENLAFAXINE HYDROCHLORIDE 37.5 MG/1
37.5 CAPSULE, EXTENDED RELEASE ORAL DAILY
COMMUNITY

## 2022-04-04 RX ORDER — HYDROXYZINE HYDROCHLORIDE 25 MG/1
25 TABLET, FILM COATED ORAL EVERY OTHER DAY
COMMUNITY

## 2022-04-04 NOTE — PROGRESS NOTES
Chief Complaint: Well Woman Exam     HPI:      Candi Chauhan is a 66 y.o.  who presents for annual exam. She is currently complaining of left lower quadrant pain from right under umbilicus down to pelvic area.     She has had this pain most days very mild for several weeks. She did have a fall and has had some back discomfort from that and also with compensating for knee problems. Denies bladder or bowel problems. Did have some increased urinary incontinence in past year that started after weight gain and has improved greatly since losing weight.     Ms. Chauhan is currently sexually active with a single male partner. She declines STD screening today. Patient does not have regular monthly menses. No LMP recorded. Patient is postmenopausal.  Menopausal complaints: none on compound HRT. Refilled recently and will notify when needed again.    Previous Pap: no abnormalities/HPV negative (10/23/2020)  Previous Mammogram: BiRads: 1 T-C Score: 5.44% (2019)   Most Recent Dexa:  osteoporosis of hip  Colonoscopy: up to date per PCP. Unsure of exact date.    Gardasil:Has never had     OB History        4    Para   4    Term   3       1    AB        Living   4       SAB        IAB        Ectopic        Multiple        Live Births   4                 GYN History  Age of Menarche:12  Age at first live birth:20  Age at Menopause:48   No abnormal pap or STDs    ROS:     GENERAL: Denies unintentional weight gain or weight loss. Feeling well overall.   SKIN: Denies rash or lesions.   HEENT: Denies headaches, or vision changes.   CARDIOVASCULAR: Denies palpitations or chest pain.   RESPIRATORY: Denies shortness of breath or dyspnea on exertion.  BREASTS: Denies pain, lumps, or nipple discharge.   ABDOMEN: Denies abdominal pain, constipation, diarrhea, nausea, vomiting, or change in appetite.   URINARY: Denies frequency, dysuria, hematuria.  NEUROLOGIC: Denies syncope or weakness.   PSYCHIATRIC: Denies  "depression, anxiety or mood swings.    Physical Exam:      PHYSICAL EXAM:  /75   Ht 5' 3" (1.6 m)   Wt 82.6 kg (182 lb 1.6 oz)   BMI 32.26 kg/m²   Body mass index is 32.26 kg/m².     APPEARANCE: Well nourished, well developed, in no acute distress.  PSYCH: Appropriate mood and affect.  SKIN: No acne or hirsutism  NECK: Neck symmetric without masses or thyromegaly  NODES: No inguinal, axillary, or supraclavicular lymph node enlargement  CHEST: Normal respiratory effort.  ABDOMEN: Soft.  No tenderness or masses.   BREASTS: Symmetrical, no skin changes or visible lesions.  No palpable masses or nipple discharge bilaterally. Implants removed this past year  PELVIC: Normal external genitalia without lesions.  Normal hair distribution.  Adequate perineal body, normal urethral meatus.  Vagina atrophic without lesions or discharge.  Cervix pink, without lesions, discharge or tenderness. There is a polypoid area at 5 o'clock that is continuous with cervix. No vascularity (patient reports told there before and scar from prior procedure).  No significant cystocele or rectocele.  Bimanual exam shows uterus to be normal size, regular, mobile and nontender.  Adnexa without masses or tenderness.    EXTREMITIES: No edema.    Assessment/Plan:     Encounter for annual routine gynecological examination  Normal exam today. Pap smear up to date. Cervical scar or lesion noted. Not atypical and patient aware told before. Declines biopsy today unless any symptoms or changes noted. Mammogram ordered. DEXA ordered due to history of osteoporosis and no follow up imaging since 2016. Osteoporosis prevention reviewed. No menopausal complaints on compound HRT and desires to continue. Normal exam of LLQ with minimal left adnexal tenderness. Will get pelvic ultrasound to rule out GYN pathology. Feel most likely musculoskeletal. Other health maintenance up to date per PCP.    Breast cancer screening by mammogram  -     Mammo Digital " Screening Bilat w/ Rich; Future; Expected date: 04/04/2022    Age-related osteoporosis without current pathological fracture  -     DXA Bone Density Spine And Hip; Future; Expected date: 04/04/2022    Menopausal state    Hormone replacement therapy    LLQ pain  -     US Pelvis Comp with Transvag NON-OB (xpd; Future; Expected date: 04/04/2022    RTC prn ultrasound    Counseling:     Patient was counseled today on current ASCCP pap guidelines, the recommendation for yearly pelvic exams, healthy diet and exercise routines, breast self awareness and annual mammograms. She is to see her PCP for other health maintenance.       Use of the "[employer]" Patient Portal discussed and encouraged during today's visit.       Neli Sagastume MD    As of April 1, 2021, the Cures Act has been passed nationally. This new law requires that all doctors progress notes, lab results, pathology reports and radiology reports be released IMMEDIATELY to the patient in the patient portal. That means that the results are released to you at the EXACT same time they are released to me. Therefore, with all of the patients that I have I am not able to reply to each patient exactly when the results come in. So there will be a delay from when you see the results to when I see them and have time to come up with a response to send you. Also I only see these results when I am on the computer at work. So if the results come in over the weekend or after 5 pm of a work day, I will not see them until the next business day. As you can tell, this is a challenge as a physician to give every patient the quick response they hope for and deserve. So please be patient! Thanks for understanding, Dr. Sagastume

## 2022-05-03 ENCOUNTER — PATIENT MESSAGE (OUTPATIENT)
Dept: OBSTETRICS AND GYNECOLOGY | Facility: CLINIC | Age: 67
End: 2022-05-03
Payer: MEDICARE

## 2022-06-23 ENCOUNTER — TELEPHONE (OUTPATIENT)
Dept: OBSTETRICS AND GYNECOLOGY | Facility: CLINIC | Age: 67
End: 2022-06-23
Payer: MEDICARE

## 2022-11-21 ENCOUNTER — TELEPHONE (OUTPATIENT)
Dept: OBSTETRICS AND GYNECOLOGY | Facility: CLINIC | Age: 67
End: 2022-11-21
Payer: MEDICARE

## 2023-06-01 ENCOUNTER — TELEPHONE (OUTPATIENT)
Dept: OBSTETRICS AND GYNECOLOGY | Facility: CLINIC | Age: 68
End: 2023-06-01
Payer: MEDICARE

## 2023-06-05 ENCOUNTER — TELEPHONE (OUTPATIENT)
Dept: OBSTETRICS AND GYNECOLOGY | Facility: CLINIC | Age: 68
End: 2023-06-05
Payer: MEDICARE

## 2023-06-05 NOTE — TELEPHONE ENCOUNTER
Dr Sagastume pt calling, the pharmacy is HOSTING # [phone] pt needs her refill Bi- Est Progest/testost 2mg-100mg. Please call pt when medication has been sent in. Pt # [phone]     6/5/23 @ 2401 Prescription called to pharm. Pt called and informed prescription was called to the pharm. Pt verbalizes understanding.

## 2023-08-01 ENCOUNTER — TELEPHONE (OUTPATIENT)
Dept: OBSTETRICS AND GYNECOLOGY | Facility: CLINIC | Age: 68
End: 2023-08-01

## 2023-08-01 NOTE — TELEPHONE ENCOUNTER
Called and spoke to patient.  Patient requests Rx refills on the Bi-Est 8020 progesterone/testosterone 2-100 MG to be sent over to [facility] pharmacy.     Please advise. Thanks.

## 2023-10-11 ENCOUNTER — OFFICE VISIT (OUTPATIENT)
Dept: OBSTETRICS AND GYNECOLOGY | Facility: CLINIC | Age: 68
End: 2023-10-11
Payer: MEDICARE

## 2023-10-11 VITALS
BODY MASS INDEX: 27.39 KG/M2 | SYSTOLIC BLOOD PRESSURE: 140 MMHG | WEIGHT: 154.56 LBS | DIASTOLIC BLOOD PRESSURE: 74 MMHG | HEIGHT: 63 IN

## 2023-10-11 DIAGNOSIS — Z11.51 ENCOUNTER FOR SCREENING FOR HUMAN PAPILLOMAVIRUS (HPV): ICD-10-CM

## 2023-10-11 DIAGNOSIS — Z12.11 SCREEN FOR COLON CANCER: ICD-10-CM

## 2023-10-11 DIAGNOSIS — Z12.4 ENCOUNTER FOR PAPANICOLAOU SMEAR FOR CERVICAL CANCER SCREENING: ICD-10-CM

## 2023-10-11 DIAGNOSIS — Z12.31 SCREENING MAMMOGRAM FOR BREAST CANCER: ICD-10-CM

## 2023-10-11 DIAGNOSIS — Z01.419 ENCOUNTER FOR ANNUAL ROUTINE GYNECOLOGICAL EXAMINATION: Primary | ICD-10-CM

## 2023-10-11 DIAGNOSIS — M81.0 AGE-RELATED OSTEOPOROSIS WITHOUT CURRENT PATHOLOGICAL FRACTURE: ICD-10-CM

## 2023-10-11 PROCEDURE — 1101F PT FALLS ASSESS-DOCD LE1/YR: CPT | Mod: CPTII,S$GLB,, | Performed by: OBSTETRICS & GYNECOLOGY

## 2023-10-11 PROCEDURE — 1160F PR REVIEW ALL MEDS BY PRESCRIBER/CLIN PHARMACIST DOCUMENTED: ICD-10-PCS | Mod: CPTII,S$GLB,, | Performed by: OBSTETRICS & GYNECOLOGY

## 2023-10-11 PROCEDURE — G0101 CA SCREEN;PELVIC/BREAST EXAM: HCPCS | Mod: GZ,S$GLB,, | Performed by: OBSTETRICS & GYNECOLOGY

## 2023-10-11 PROCEDURE — 1101F PR PT FALLS ASSESS DOC 0-1 FALLS W/OUT INJ PAST YR: ICD-10-PCS | Mod: CPTII,S$GLB,, | Performed by: OBSTETRICS & GYNECOLOGY

## 2023-10-11 PROCEDURE — 88175 CYTOPATH C/V AUTO FLUID REDO: CPT | Performed by: OBSTETRICS & GYNECOLOGY

## 2023-10-11 PROCEDURE — 99999 PR PBB SHADOW E&M-EST. PATIENT-LVL III: CPT | Mod: PBBFAC,,, | Performed by: OBSTETRICS & GYNECOLOGY

## 2023-10-11 PROCEDURE — 1126F AMNT PAIN NOTED NONE PRSNT: CPT | Mod: CPTII,S$GLB,, | Performed by: OBSTETRICS & GYNECOLOGY

## 2023-10-11 PROCEDURE — 3077F PR MOST RECENT SYSTOLIC BLOOD PRESSURE >= 140 MM HG: ICD-10-PCS | Mod: CPTII,S$GLB,, | Performed by: OBSTETRICS & GYNECOLOGY

## 2023-10-11 PROCEDURE — 3288F PR FALLS RISK ASSESSMENT DOCUMENTED: ICD-10-PCS | Mod: CPTII,S$GLB,, | Performed by: OBSTETRICS & GYNECOLOGY

## 2023-10-11 PROCEDURE — 87624 HPV HI-RISK TYP POOLED RSLT: CPT | Performed by: OBSTETRICS & GYNECOLOGY

## 2023-10-11 PROCEDURE — 1160F RVW MEDS BY RX/DR IN RCRD: CPT | Mod: CPTII,S$GLB,, | Performed by: OBSTETRICS & GYNECOLOGY

## 2023-10-11 PROCEDURE — 1159F MED LIST DOCD IN RCRD: CPT | Mod: CPTII,S$GLB,, | Performed by: OBSTETRICS & GYNECOLOGY

## 2023-10-11 PROCEDURE — 3078F PR MOST RECENT DIASTOLIC BLOOD PRESSURE < 80 MM HG: ICD-10-PCS | Mod: CPTII,S$GLB,, | Performed by: OBSTETRICS & GYNECOLOGY

## 2023-10-11 PROCEDURE — 99999 PR PBB SHADOW E&M-EST. PATIENT-LVL III: ICD-10-PCS | Mod: PBBFAC,,, | Performed by: OBSTETRICS & GYNECOLOGY

## 2023-10-11 PROCEDURE — G0101 PR CA SCREEN;PELVIC/BREAST EXAM: ICD-10-PCS | Mod: GZ,S$GLB,, | Performed by: OBSTETRICS & GYNECOLOGY

## 2023-10-11 PROCEDURE — 1159F PR MEDICATION LIST DOCUMENTED IN MEDICAL RECORD: ICD-10-PCS | Mod: CPTII,S$GLB,, | Performed by: OBSTETRICS & GYNECOLOGY

## 2023-10-11 PROCEDURE — 3288F FALL RISK ASSESSMENT DOCD: CPT | Mod: CPTII,S$GLB,, | Performed by: OBSTETRICS & GYNECOLOGY

## 2023-10-11 PROCEDURE — 1126F PR PAIN SEVERITY QUANTIFIED, NO PAIN PRESENT: ICD-10-PCS | Mod: CPTII,S$GLB,, | Performed by: OBSTETRICS & GYNECOLOGY

## 2023-10-11 PROCEDURE — 3078F DIAST BP <80 MM HG: CPT | Mod: CPTII,S$GLB,, | Performed by: OBSTETRICS & GYNECOLOGY

## 2023-10-11 PROCEDURE — 3077F SYST BP >= 140 MM HG: CPT | Mod: CPTII,S$GLB,, | Performed by: OBSTETRICS & GYNECOLOGY

## 2023-10-11 RX ORDER — NYSTATIN 100000 [USP'U]/ML
SUSPENSION ORAL
COMMUNITY
Start: 2023-08-14

## 2023-10-11 RX ORDER — MONTELUKAST SODIUM 10 MG/1
10 TABLET ORAL NIGHTLY
COMMUNITY
Start: 2023-08-14

## 2023-10-11 RX ORDER — TIRZEPATIDE 10 MG/.5ML
INJECTION, SOLUTION SUBCUTANEOUS
COMMUNITY
Start: 2023-09-11 | End: 2023-12-23

## 2023-10-11 RX ORDER — MONTELUKAST SODIUM 10 MG/1
1 TABLET ORAL NIGHTLY
COMMUNITY
Start: 2023-08-14 | End: 2023-12-23

## 2023-10-11 RX ORDER — VALACYCLOVIR HYDROCHLORIDE 1 G/1
TABLET, FILM COATED ORAL
COMMUNITY
Start: 2023-09-10

## 2023-10-11 RX ORDER — BENZONATATE 100 MG/1
CAPSULE ORAL
COMMUNITY
Start: 2023-09-06

## 2023-10-11 RX ORDER — TIRZEPATIDE 7.5 MG/.5ML
INJECTION, SOLUTION SUBCUTANEOUS
COMMUNITY
Start: 2023-06-08 | End: 2023-12-23

## 2023-10-11 RX ORDER — AMOXICILLIN 500 MG/1
CAPSULE ORAL
COMMUNITY
Start: 2023-05-19 | End: 2023-12-23

## 2023-10-11 RX ORDER — FLUTICASONE FUROATE, UMECLIDINIUM BROMIDE AND VILANTEROL TRIFENATATE 200; 62.5; 25 UG/1; UG/1; UG/1
POWDER RESPIRATORY (INHALATION)
COMMUNITY
Start: 2023-10-06

## 2023-10-11 RX ORDER — TIRZEPATIDE 5 MG/.5ML
INJECTION, SOLUTION SUBCUTANEOUS
COMMUNITY
Start: 2023-05-08 | End: 2023-12-23

## 2023-10-11 RX ORDER — FLUTICASONE FUROATE, UMECLIDINIUM BROMIDE AND VILANTEROL TRIFENATATE 200; 62.5; 25 UG/1; UG/1; UG/1
1 POWDER RESPIRATORY (INHALATION)
COMMUNITY
Start: 2023-08-14 | End: 2023-11-12

## 2023-10-11 RX ORDER — CELECOXIB 100 MG/1
100 CAPSULE ORAL 2 TIMES DAILY PRN
COMMUNITY
Start: 2023-06-13 | End: 2023-12-23

## 2023-10-15 LAB
FINAL PATHOLOGIC DIAGNOSIS: NORMAL
Lab: NORMAL

## 2023-10-25 ENCOUNTER — HOSPITAL ENCOUNTER (OUTPATIENT)
Dept: RADIOLOGY | Facility: HOSPITAL | Age: 68
Discharge: HOME | End: 2023-10-25
Attending: OBSTETRICS & GYNECOLOGY
Payer: MEDICARE

## 2023-10-25 VITALS — BODY MASS INDEX: 27.39 KG/M2 | WEIGHT: 154.56 LBS | HEIGHT: 63 IN

## 2023-10-25 DIAGNOSIS — Z12.31 SCREENING MAMMOGRAM FOR BREAST CANCER: ICD-10-CM

## 2023-10-25 PROCEDURE — 77067 SCR MAMMO BI INCL CAD: CPT | Mod: TC

## 2023-10-25 PROCEDURE — 77067 SCR MAMMO BI INCL CAD: CPT | Mod: 26,,, | Performed by: RADIOLOGY

## 2023-10-25 PROCEDURE — 77067 MAMMO DIGITAL SCREENING BILAT WITH TOMO: ICD-10-PCS | Mod: 26,,, | Performed by: RADIOLOGY

## 2023-10-25 PROCEDURE — 77063 MAMMO DIGITAL SCREENING BILAT WITH TOMO: ICD-10-PCS | Mod: 26,,, | Performed by: RADIOLOGY

## 2023-10-25 PROCEDURE — 77063 BREAST TOMOSYNTHESIS BI: CPT | Mod: 26,,, | Performed by: RADIOLOGY

## 2023-12-23 NOTE — PROGRESS NOTES
"Chief Complaint: Well Woman Exam     HPI:      Candi Chauhan is a 68 y.o.  who presents today for well woman exam.  LMP: No LMP recorded. Patient is postmenopausal.  No issues, problems, or complaints. Specifically, patient denies abnormal vaginal bleeding, discharge, pelvic pain, urinary problems, or changes in appetite. Ms. Chauhan is currently sexually active with a single male partner. She declines STD screening today. Patient does not have regular monthly menses. No LMP recorded. Patient is postmenopausal. Menopausal complaints: stable on compounded HRT.    Previous Pap: no abnormalities  10/1/20  Previous Mammogram: 17 with last done with implant surgery in  by plastic surgery.  Most Recent Dexa: 2016 osteoporosis of hip (Vit D only given)  Colonoscopy: due but refuses colonoscopy     OB History          4    Para   4    Term   3       1    AB        Living   4         SAB        IAB        Ectopic        Multiple        Live Births   4                 GYN History  Age of Menarche:12  Age at first pregnancy:    Age at first live birth:20  Number of months breastfeeding:    Age at Menopause:48   Comments: No abnormal pap or STDs           ROS:     GENERAL: Denies unintentional weight gain or weight loss. Feeling well overall.   SKIN: Denies rash or lesions.   HEENT: Denies headaches, or vision changes.   CARDIOVASCULAR: Denies palpitations or chest pain.   RESPIRATORY: Denies shortness of breath or dyspnea on exertion.  BREASTS: Denies pain, lumps, or nipple discharge.   ABDOMEN: Denies abdominal pain, constipation, diarrhea, nausea, vomiting, change in appetite.  URINARY: Denies frequency, dysuria, hematuria.  NEUROLOGIC: Denies syncope or weakness.   PSYCHIATRIC: Denies depression, anxiety or mood swings.    Physical Exam:      PHYSICAL EXAM:  BP (!) 140/74   Ht 5' 3" (1.6 m)   Wt 70.1 kg (154 lb 8.7 oz)   BMI 27.38 kg/m²   Body mass index is 27.38 kg/m².     APPEARANCE: " Well nourished, well developed, in no acute distress.  PSYCH: Appropriate mood and affect.  SKIN: No acne or hirsutism  NECK: Neck symmetric without masses or thyromegaly  NODES: No inguinal, axillary, or supraclavicular lymph node enlargement  ABDOMEN: Soft.  No tenderness or masses.    CARDIOVASCULAR: No edema of peripheral extremities  BREASTS: Symmetrical, no skin changes or visible lesions.  No palpable masses or nipple discharge bilaterally.  PELVIC: Normal external genitalia without lesions.  Normal hair distribution.  Adequate perineal body, normal urethral meatus.  Vagina moist and well rugated without lesions or discharge.  Cervix pink, without lesions, discharge or tenderness.  No significant cystocele or rectocele.  Bimanual exam shows uterus to be normal size, regular, mobile and nontender.  Adnexa without masses or tenderness.      Assessment/Plan:     Encounter for annual routine gynecological examination    Encounter for Papanicolaou smear for cervical cancer screening  -     Liquid-Based Pap Smear, Screening    Encounter for screening for human papillomavirus (HPV)  -     HPV High Risk Genotypes, PCR    Screening mammogram for breast cancer  -     Mammo Digital Screening Bilat w/ Rich; Future; Expected date: 10/11/2023    Age-related osteoporosis without current pathological fracture  -     DXA Bone Density Axial Skeleton 1 or more sites; Future; Expected date: 10/11/2023    Screen for colon cancer  -     Cologuard Screening (Multitarget Stool DNA); Future; Expected date: 10/11/2023      RTC 1 year    Counseling:     Patient was counseled today on current ASCCP pap guidelines, the recommendation for yearly pelvic exams, healthy diet and exercise routines, breast self awareness and annual mammograms.She is to see her PCP for other health maintenance.     Use of the [facility] Patient Portal discussed and encouraged during today's visit.       Neli Sagastume MD      As of April 1, 2021, the Cures Act has  been passed nationally. This new law requires that all doctors progress notes, lab results, pathology reports and radiology reports be released IMMEDIATELY to the patient in the patient portal. That means that the results are released to you at the EXACT same time they are released to me. Therefore, with all of the patients that I have I am not able to reply to each patient exactly when the results come in. So there will be a delay from when you see the results to when I see them and have time to come up with a response to send you. Also I only see these results when I am on the computer at work. So if the results come in over the weekend or after 5 pm of a work day, I will not see them until the next business day. As you can tell, this is a challenge as a physician to give every patient the quick response they hope for and deserve. So please be patient! Thanks for understanding, Dr. Sagastume

## 2024-02-08 ENCOUNTER — TELEPHONE (OUTPATIENT)
Dept: OBSTETRICS AND GYNECOLOGY | Facility: CLINIC | Age: 69
End: 2024-02-08
Payer: MEDICARE

## 2024-02-08 NOTE — TELEPHONE ENCOUNTER
Yes, that works!  From chart review looks like this was her dosing:  BI-E 80:20 2mg/ Progest 100mg/Testosterone 4mg capsule   90 days 3 refills     Prescription called into pharmacy. Pt notified Rx called into [facility]

## 2024-02-08 NOTE — TELEPHONE ENCOUNTER
Pt states she needs a refill of compounded HRT called into [facility], not [facility].  States she has 1 pill left.  Aware Dr. Sagastume is not in the office until 2/19, recommended she call 1 month in advance in the future for refills.

## 2024-02-08 NOTE — TELEPHONE ENCOUNTER
Pt called back, will need rx sent over to [facility].  [facility] went out of business.

## 2024-08-20 ENCOUNTER — TELEPHONE (OUTPATIENT)
Dept: OBSTETRICS AND GYNECOLOGY | Facility: CLINIC | Age: 69
End: 2024-08-20
Payer: MEDICARE

## 2024-08-20 NOTE — TELEPHONE ENCOUNTER
----- Message from Angelica Socrates sent at 8/20/2024  2:29 PM CDT -----  Contact: Patient  [phone]  Requesting an RX refill or new RX.    Is this a refill or new RX: REFILL    RX name and strength (copy/paste from chart):    Yes, that works!  From chart review looks like this was her dosing:  BI-E 80:20 2mg/ Progest 100mg/Testosterone 4mg capsule           Is this a 30 day or 90 day RX:     Pharmacy name and phone # (copy/paste from chart):    [facility] [place] - [address]  [address] [facility] [address]  Phone: [phone] Fax: [phone]         The doctors have asked that we provide their patients with the following 2 reminders -- prescription refills can take up to 72 hours, and a friendly reminder that in the future you can use your [facility] account to request refills:

## 2024-08-21 NOTE — TELEPHONE ENCOUNTER
8/21/24 @ 0834 Spoke with pharmacy rx refilled per Dr Sagastume.6 mo only spoke with Sridhar Juares to notify pt.

## 2025-02-21 ENCOUNTER — TELEPHONE (OUTPATIENT)
Dept: OBSTETRICS AND GYNECOLOGY | Facility: CLINIC | Age: 70
End: 2025-02-21
Payer: MEDICARE

## 2025-02-21 NOTE — TELEPHONE ENCOUNTER
Pt is requesting refill on compounded medication I sent message earlier     Pt last seen 10/2023 also has upcoming appointment 3/2025

## 2025-02-21 NOTE — TELEPHONE ENCOUNTER
called pt twice / no answer left voice message     - informing pt the call is regarding compounded medication refill . notifted pt through voice message i have sent medication refill request over to  and when she get a moment from working a full clinic to review she will further with care .

## 2025-03-17 ENCOUNTER — OFFICE VISIT (OUTPATIENT)
Dept: OBSTETRICS AND GYNECOLOGY | Facility: CLINIC | Age: 70
End: 2025-03-17
Payer: MEDICARE

## 2025-03-17 VITALS
DIASTOLIC BLOOD PRESSURE: 60 MMHG | SYSTOLIC BLOOD PRESSURE: 126 MMHG | BODY MASS INDEX: 28.18 KG/M2 | WEIGHT: 159.06 LBS

## 2025-03-17 DIAGNOSIS — Z01.419 ENCOUNTER FOR ANNUAL ROUTINE GYNECOLOGICAL EXAMINATION: Primary | ICD-10-CM

## 2025-03-17 DIAGNOSIS — Z78.0 ASYMPTOMATIC MENOPAUSE: ICD-10-CM

## 2025-03-17 DIAGNOSIS — Z12.31 SCREENING MAMMOGRAM FOR BREAST CANCER: ICD-10-CM

## 2025-03-17 DIAGNOSIS — B37.31 CANDIDAL VAGINITIS: ICD-10-CM

## 2025-03-17 DIAGNOSIS — Z79.890 HORMONE REPLACEMENT THERAPY: ICD-10-CM

## 2025-03-17 PROCEDURE — 99999 PR PBB SHADOW E&M-EST. PATIENT-LVL III: CPT | Mod: PBBFAC,,, | Performed by: OBSTETRICS & GYNECOLOGY

## 2025-03-17 PROCEDURE — 1126F AMNT PAIN NOTED NONE PRSNT: CPT | Mod: CPTII,S$GLB,, | Performed by: OBSTETRICS & GYNECOLOGY

## 2025-03-17 PROCEDURE — G0101 CA SCREEN;PELVIC/BREAST EXAM: HCPCS | Mod: S$GLB,,, | Performed by: OBSTETRICS & GYNECOLOGY

## 2025-03-17 PROCEDURE — 1159F MED LIST DOCD IN RCRD: CPT | Mod: CPTII,S$GLB,, | Performed by: OBSTETRICS & GYNECOLOGY

## 2025-03-17 PROCEDURE — 1101F PT FALLS ASSESS-DOCD LE1/YR: CPT | Mod: CPTII,S$GLB,, | Performed by: OBSTETRICS & GYNECOLOGY

## 2025-03-17 PROCEDURE — 3078F DIAST BP <80 MM HG: CPT | Mod: CPTII,S$GLB,, | Performed by: OBSTETRICS & GYNECOLOGY

## 2025-03-17 PROCEDURE — 3288F FALL RISK ASSESSMENT DOCD: CPT | Mod: CPTII,S$GLB,, | Performed by: OBSTETRICS & GYNECOLOGY

## 2025-03-17 PROCEDURE — 1160F RVW MEDS BY RX/DR IN RCRD: CPT | Mod: CPTII,S$GLB,, | Performed by: OBSTETRICS & GYNECOLOGY

## 2025-03-17 PROCEDURE — 3074F SYST BP LT 130 MM HG: CPT | Mod: CPTII,S$GLB,, | Performed by: OBSTETRICS & GYNECOLOGY

## 2025-03-17 RX ORDER — FUROSEMIDE 20 MG/1
20 TABLET ORAL DAILY PRN
COMMUNITY
Start: 2024-10-13

## 2025-03-17 RX ORDER — FLUCONAZOLE 150 MG/1
150 TABLET ORAL
Qty: 2 TABLET | Refills: 0 | Status: SHIPPED | OUTPATIENT
Start: 2025-03-17 | End: 2025-03-21

## 2025-03-17 RX ORDER — VENLAFAXINE HYDROCHLORIDE 75 MG/1
75 CAPSULE, EXTENDED RELEASE ORAL
COMMUNITY
Start: 2025-01-19

## 2025-03-17 RX ORDER — ERGOCALCIFEROL 1.25 MG/1
50000 CAPSULE ORAL
COMMUNITY
Start: 2024-11-17

## 2025-03-17 RX ORDER — CETIRIZINE HYDROCHLORIDE 10 MG/1
10 TABLET, FILM COATED ORAL NIGHTLY
COMMUNITY
Start: 2024-10-13

## 2025-03-17 RX ORDER — POTASSIUM CHLORIDE 20 MEQ/1
20 TABLET, EXTENDED RELEASE ORAL
COMMUNITY
Start: 2024-10-13

## 2025-03-17 NOTE — PROGRESS NOTES
Chief Complaint: Well Woman Exam     HPI:      Candi Chauhan is a 69 y.o.  who presents today for well woman exam.  LMP: No LMP recorded. Patient is postmenopausal.  No issues, problems, or complaints. Specifically, patient denies abnormal vaginal bleeding, discharge, pelvic pain, urinary problems, or changes in appetite. Ms. Chauhan is currently sexually active with a single male partner. She declines STD screening today. Patient does not have regular monthly menses. No LMP recorded. Patient is postmenopausal. Menopausal complaints: none on current HRT regimen per hormone clinic. Did not transfer to [facility]. Had implants removed.    Previous Pap: no abnormalities/HPV negative  (10/15/2023)  Previous Mammogram: BiRads: 1 T-C Score: 2.53% Results for orders placed during the hospital encounter of 10/25/23    Mammo Digital Screening Bilat w/ Rich    Narrative  Result:  Mammo Digital Screening Bilat w/ Rich    History:  Patient is 68 y.o. and is seen for a screening mammogram.      Films Compared:  Prior images (if available) were compared.    Findings:  This procedure was performed using tomosynthesis.  Computer-aided detection was utilized in the interpretation of this examination.    The breasts have scattered areas of fibroglandular density. There is no evidence of suspicious masses, microcalcifications or architectural distortion.    Impression  No mammographic evidence of malignancy.    BI-RADS Category 1: Negative    Recommendation:  Routine screening mammogram in 1 year is recommended.    Your estimated lifetime risk of breast cancer (to age 85) based on Tyrer-Cuzick risk assessment model is 2.53 %.  According to the American Cancer Society, patients with a lifetime breast cancer risk of 20% or higher might benefit from supplemental screening tests, such as screening breast MRI.     Most Recent Dexa: 2016 and not done follow up  Colonoscopy: per PCP  Labs at [facility] per PCP    OB History           4    Para   4    Term   3       1    AB        Living   4         SAB        IAB        Ectopic        Multiple        Live Births   4                 GYN History  Age of Menarche:12  Age at first pregnancy:    Age at first live birth:20  Age at Menopause:48   Comments: No abnormal pap or STDs           ROS:     GENERAL: Denies unintentional weight gain or weight loss. Feeling well overall.   SKIN: Denies rash or lesions.   HEENT: Denies headaches, or vision changes.   CARDIOVASCULAR: Denies palpitations or chest pain.   RESPIRATORY: Denies shortness of breath or dyspnea on exertion.  BREASTS: Denies pain, lumps, or nipple discharge.   ABDOMEN: Denies abdominal pain, constipation, diarrhea, nausea, vomiting, change in appetite.  URINARY: Denies frequency, dysuria, hematuria.  NEUROLOGIC: Denies syncope or weakness.   PSYCHIATRIC: Denies depression, anxiety or mood swings.    Physical Exam:      PHYSICAL EXAM:  /60   Wt 72.1 kg (159 lb 1 oz)   BMI 28.18 kg/m²   Body mass index is 28.18 kg/m².     APPEARANCE: Well nourished, well developed, in no acute distress.  PSYCH: Appropriate mood and affect.  SKIN: No acne or hirsutism  NECK: Neck symmetric without masses or thyromegaly  NODES: No inguinal, axillary, or supraclavicular lymph node enlargement  ABDOMEN: Soft.  No tenderness or masses.    CARDIOVASCULAR: No edema of peripheral extremities  BREASTS: Symmetrical, no skin changes or visible lesions.  No palpable masses or nipple discharge bilaterally.  PELVIC: Normal external genitalia without lesions.  Normal hair distribution.  Adequate perineal body, normal urethral meatus.  Vagina mildly atrophic without lesions or discharge.  Cervix pink, without lesions, discharge or tenderness.  No significant cystocele or rectocele.  Bimanual exam shows uterus to be normal size, regular, mobile and nontender.  Adnexa without masses or tenderness.      Assessment/Plan:     Encounter for annual  routine gynecological examination    Screening mammogram for breast cancer  -     Mammo Digital Screening Bilat w/ Rich (XPD); Future; Expected date: 03/17/2025    Asymptomatic menopause  -     DXA Bone Density Axial Skeleton 1 or more sites; Future; Expected date: 03/17/2025    Candidal vaginitis  -     fluconazole (DIFLUCAN) 150 MG Tab; Take 1 tablet (150 mg total) by mouth Every 3 (three) days. for 2 doses  Dispense: 2 tablet; Refill: 0    Hormone replacement therapy  Continue compound HRT at current dose as was unable to wean off and concerned about failing health of her mother once discontinued. Risks/benefits reviewed. Discussed importance of adherence to regular mammogram and labs yearly to maintain safety with use of HRT.      RTC 1 year    Counseling:     Patient was counseled today on current ASCCP pap guidelines, the recommendation for yearly pelvic exams, healthy diet and exercise routines, breast self awareness and annual mammograms.She is to see her PCP for other health maintenance.     Use of the [employer] Patient Portal discussed and encouraged during today's visit.       Neli Sagastume MD      As of April 1, 2021, the Cures Act has been passed nationally. This new law requires that all doctors progress notes, lab results, pathology reports and radiology reports be released IMMEDIATELY to the patient in the patient portal. That means that the results are released to you at the EXACT same time they are released to me. Therefore, with all of the patients that I have I am not able to reply to each patient exactly when the results come in. So there will be a delay from when you see the results to when I see them and have time to come up with a response to send you. Also I only see these results when I am on the computer at work. So if the results come in over the weekend or after 5 pm of a work day, I will not see them until the next business day. As you can tell, this is a challenge as a physician to give  every patient the quick response they hope for and deserve. So please be patient! Thanks for understanding, Dr. Sagastume

## 2025-03-27 ENCOUNTER — TELEPHONE (OUTPATIENT)
Dept: OBSTETRICS AND GYNECOLOGY | Facility: CLINIC | Age: 70
End: 2025-03-27
Payer: MEDICARE

## 2025-03-27 NOTE — TELEPHONE ENCOUNTER
Called [facility] to call in compound medication Biest 80:20 2mg Progesterone 100mg Testosterone 4mg SR E4M Capsules quantity 30 with 30 day supply refills not show on order detail . No further questions or concerns .

## 2025-03-31 ENCOUNTER — PATIENT MESSAGE (OUTPATIENT)
Dept: OBSTETRICS AND GYNECOLOGY | Facility: CLINIC | Age: 70
End: 2025-03-31
Payer: MEDICARE

## 2025-03-31 DIAGNOSIS — N95.1 MENOPAUSAL STATE: ICD-10-CM

## 2025-03-31 DIAGNOSIS — F41.9 ANXIETY DISORDER, UNSPECIFIED: ICD-10-CM

## 2025-03-31 DIAGNOSIS — F59 UNSPECIFIED BEHAVIORAL SYNDROMES ASSOCIATED WITH PHYSIOLOGICAL DISTURBANCES AND PHYSICAL FACTORS: ICD-10-CM

## 2025-03-31 DIAGNOSIS — Z79.899 OTHER LONG TERM (CURRENT) DRUG THERAPY: ICD-10-CM

## 2025-03-31 DIAGNOSIS — Z79.890 HORMONE REPLACEMENT THERAPY: ICD-10-CM

## 2025-03-31 DIAGNOSIS — E66.3 OVERWEIGHT: ICD-10-CM

## 2025-03-31 DIAGNOSIS — Z51.81 MEDICATION MONITORING ENCOUNTER: ICD-10-CM

## 2025-03-31 DIAGNOSIS — Z00.00 HEALTH CARE MAINTENANCE: Primary | ICD-10-CM

## 2025-04-04 ENCOUNTER — TELEPHONE (OUTPATIENT)
Dept: OBSTETRICS AND GYNECOLOGY | Facility: CLINIC | Age: 70
End: 2025-04-04
Payer: MEDICARE

## 2025-04-04 NOTE — TELEPHONE ENCOUNTER
Called pt / pt answered       - notified pt the call is regarding hormone labs needing to be drawn . Asked pt if she had any hormone labs level labs drawn in the last 6-12 month . Pt verbally expressed she did not and can go get lab draws when she goes to do her mammo on 4/28 . I approved and scheduled lab appt at [facility] on 4/28 for 12:15 . No further questions or concerns .

## 2025-04-08 ENCOUNTER — TELEPHONE (OUTPATIENT)
Dept: OBSTETRICS AND GYNECOLOGY | Facility: CLINIC | Age: 70
End: 2025-04-08
Payer: MEDICARE

## 2025-04-08 NOTE — TELEPHONE ENCOUNTER
Called pt / pt answered     Informed her the call is regarding scheduling fasting labs . Pt agreed to do fasting labs on 4/28 with her mmg /dexa appointments at [facility] . No further questions or concerns ( I linked labs to appt)

## 2025-04-28 ENCOUNTER — HOSPITAL ENCOUNTER (OUTPATIENT)
Dept: RADIOLOGY | Facility: HOSPITAL | Age: 70
Discharge: HOME | End: 2025-04-28
Attending: OBSTETRICS & GYNECOLOGY
Payer: MEDICARE

## 2025-04-28 ENCOUNTER — HOSPITAL ENCOUNTER (OUTPATIENT)
Dept: RADIOLOGY | Facility: CLINIC | Age: 70
Discharge: HOME | End: 2025-04-28
Attending: OBSTETRICS & GYNECOLOGY
Payer: MEDICARE

## 2025-04-28 DIAGNOSIS — Z78.0 ASYMPTOMATIC MENOPAUSE: ICD-10-CM

## 2025-04-28 DIAGNOSIS — Z12.31 SCREENING MAMMOGRAM FOR BREAST CANCER: ICD-10-CM

## 2025-04-28 PROCEDURE — 77063 BREAST TOMOSYNTHESIS BI: CPT | Mod: TC,PO

## 2025-04-28 PROCEDURE — 77080 DXA BONE DENSITY AXIAL: CPT | Mod: TC,PO

## 2025-04-28 PROCEDURE — 77080 DXA BONE DENSITY AXIAL: CPT | Mod: 26,,, | Performed by: INTERNAL MEDICINE

## 2025-04-28 PROCEDURE — 77063 BREAST TOMOSYNTHESIS BI: CPT | Mod: 26,,, | Performed by: RADIOLOGY

## 2025-04-28 PROCEDURE — 77067 SCR MAMMO BI INCL CAD: CPT | Mod: 26,,, | Performed by: RADIOLOGY

## 2025-05-10 ENCOUNTER — RESULTS FOLLOW-UP (OUTPATIENT)
Dept: OBSTETRICS AND GYNECOLOGY | Facility: CLINIC | Age: 70
End: 2025-05-10

## 2025-05-10 ENCOUNTER — RESULTS FOLLOW-UP (OUTPATIENT)
Dept: OBSTETRICS AND GYNECOLOGY | Facility: CLINIC | Age: 70
End: 2025-05-10
Payer: MEDICARE

## 2025-05-28 ENCOUNTER — TELEPHONE (OUTPATIENT)
Dept: OBSTETRICS AND GYNECOLOGY | Facility: CLINIC | Age: 70
End: 2025-05-28
Payer: MEDICARE

## 2025-05-28 NOTE — TELEPHONE ENCOUNTER
Called pharmacy to inform them about compound medication . Let them know it is a 30 day supply and 30 each quantity . Pharmacist expressed understanding ang took my government name . No further questions or concerns .

## 2025-07-30 ENCOUNTER — TELEPHONE (OUTPATIENT)
Dept: OBSTETRICS AND GYNECOLOGY | Facility: CLINIC | Age: 70
End: 2025-07-30
Payer: MEDICARE

## 2025-07-30 NOTE — TELEPHONE ENCOUNTER
Biest 80:20 2mg Progesterone 100mg Testosterone 4mg SR E4M Capsules Not Available [id number] No   Ingredients Requested from Pharmacy   Estriol Micronized Powder    Total dispense quantity: 0.16 g   Estradiol Micronized Powder    Total dispense quantity: 0.04 g   Progesterone Micronized Powder    Total dispense quantity: 10 g   Testosterone Micronized Powder    Total dispense quantity: 0.4 g   Hypromellose Powder    Total dispense quantity: 10 g   Riboflavin Powder    Total dispense quantity: 0.05 g   Lactose Anhydrous Powder    Total dispense quantity: 3.6 g        7/30/25 @ 0843 Called refill to  [facility], Disp 30 with 1 refill.